# Patient Record
Sex: FEMALE | Race: BLACK OR AFRICAN AMERICAN | NOT HISPANIC OR LATINO | Employment: PART TIME | URBAN - METROPOLITAN AREA
[De-identification: names, ages, dates, MRNs, and addresses within clinical notes are randomized per-mention and may not be internally consistent; named-entity substitution may affect disease eponyms.]

---
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
## 2024-05-30 ENCOUNTER — OFFICE VISIT (OUTPATIENT)
Dept: URGENT CARE | Facility: URGENT CARE | Age: 19
End: 2024-05-30

## 2024-05-30 VITALS
DIASTOLIC BLOOD PRESSURE: 75 MMHG | HEART RATE: 69 BPM | OXYGEN SATURATION: 98 % | TEMPERATURE: 97.9 F | SYSTOLIC BLOOD PRESSURE: 118 MMHG

## 2024-05-30 DIAGNOSIS — R10.84 ABDOMINAL PAIN, GENERALIZED: Primary | ICD-10-CM

## 2024-05-30 PROCEDURE — 99203 OFFICE O/P NEW LOW 30 MIN: CPT | Performed by: PHYSICIAN ASSISTANT

## 2024-05-30 ASSESSMENT — ENCOUNTER SYMPTOMS
NAUSEA: 0
DIARRHEA: 0
FREQUENCY: 0
VOMITING: 0
DYSURIA: 0
SORE THROAT: 0
ABDOMINAL PAIN: 1
RHINORRHEA: 0
CONSTIPATION: 0
FEVER: 0
COUGH: 0

## 2024-05-30 NOTE — LETTER
May 30, 2024      Ewelina Berger  [address]  [facility] [address]        To Whom It May Concern:    Ewelina Berger  was seen today.  Please excuse her from work today and an additional two days as needed due to illness.        Sincerely,        Meri Augustin PA-C

## 2024-05-30 NOTE — PROGRESS NOTES
Assessment & Plan:        ICD-10-CM    1. Abdominal pain, generalized  R10.84             Plan/Clinical Decision Making:    Patient with acute generalized abdominal pain since this morning. Benign abdominal exam today.   Possible early viral illness. Monitor symptoms, clear fluids. Rest. Note work done.       Return if symptoms worsen or fail to improve, for in 3-5 days.     At the end of the encounter, I discussed results, diagnosis, medications. Discussed red flags for immediate return to clinic/ER, as well as indications for follow up if no improvement. Patient understood and agreed to plan. Patient was stable for discharge.        Meri Augustin PA-C on 5/30/2024 at 11:18 AM          Subjective:     HPI:    Ewelina is a 19 year old female who presents to clinic today for the following health issues:  Chief Complaint   Patient presents with    Urgent Care     Abdominal pain that started this morning. Pain goes up and down abdomin and is sharp and dull, pt states that she feels like she needs to make a bowel movement and can't. Also she can't stay in one position for a long time, pt states that she had chile oil last night.      HPI    On her way to work this morning and developed abdominal pain.   No heartburn. No epigastric pain. Pain generalized.     Last BM last night.     Review of Systems   Constitutional:  Negative for fever.   HENT:  Negative for congestion, rhinorrhea and sore throat.    Respiratory:  Negative for cough.    Gastrointestinal:  Positive for abdominal pain. Negative for constipation, diarrhea, nausea and vomiting.   Genitourinary:  Negative for dysuria, frequency and urgency.         There is no problem list on file for this patient.       No past medical history on file.    Social History     Tobacco Use    Smoking status: Not on file    Smokeless tobacco: Not on file   Substance Use Topics    Alcohol use: Not on file             Objective:     Vitals:    05/30/24 1057   BP: 118/75   BP  Location: Right arm   Patient Position: Sitting   Cuff Size: Adult Regular   Pulse: 69   Temp: 97.9  F (36.6  C)   TempSrc: Tympanic   SpO2: 98%         Physical Exam   EXAM:   Pleasant, alert, appropriate appearance. NAD.  Head Exam: Normocephalic, atraumatic.  Eye Exam:  non icteric/injection.    OroPharynx Exam:  Moist mucous membranes. No erythema, pharynx without exudate or hypertrophy.  Neck/Thyroid Exam:  No LAD.    Chest/Respiratory Exam: CTAB.  Cardiovascular Exam: RRR. No edema.   ABD: soft, RUQ and RLQ tenderness, normal bowel sounds, no rebound/guarding.  No masses/organomegaly.  Ext/musculoskeletal: Grossly intact, No edema,   Skin: no rash or lesion.      Results:  No results found for any visits on 05/30/24.